# Patient Record
Sex: FEMALE | Race: ASIAN | NOT HISPANIC OR LATINO | Employment: OTHER | URBAN - METROPOLITAN AREA
[De-identification: names, ages, dates, MRNs, and addresses within clinical notes are randomized per-mention and may not be internally consistent; named-entity substitution may affect disease eponyms.]

---

## 2023-08-24 ENCOUNTER — OFFICE VISIT (OUTPATIENT)
Dept: OBSTETRICS AND GYNECOLOGY | Facility: CLINIC | Age: 83
End: 2023-08-24
Payer: MEDICARE

## 2023-08-24 VITALS
SYSTOLIC BLOOD PRESSURE: 138 MMHG | BODY MASS INDEX: 24.74 KG/M2 | WEIGHT: 126 LBS | DIASTOLIC BLOOD PRESSURE: 62 MMHG | HEIGHT: 60 IN

## 2023-08-24 DIAGNOSIS — N81.4 CYSTOCELE WITH PROLAPSE: Primary | ICD-10-CM

## 2023-08-24 RX ORDER — AMLODIPINE BESYLATE 2.5 MG/1
TABLET ORAL
COMMUNITY
Start: 2023-06-14

## 2023-08-24 RX ORDER — UBIDECARENONE 200 MG
1 CAPSULE ORAL
COMMUNITY

## 2023-08-24 RX ORDER — LEVOTHYROXINE SODIUM 0.1 MG/1
100 TABLET ORAL DAILY
COMMUNITY

## 2023-08-24 RX ORDER — DIPHENOXYLATE HYDROCHLORIDE AND ATROPINE SULFATE 2.5; .025 MG/1; MG/1
TABLET ORAL
COMMUNITY

## 2023-08-24 RX ORDER — ATORVASTATIN CALCIUM 40 MG/1
40 TABLET, FILM COATED ORAL DAILY
COMMUNITY

## 2023-08-24 RX ORDER — YEAST,DRIED (S. CEREVISIAE)
1 POWDER (GRAM) ORAL
COMMUNITY

## 2023-08-24 RX ORDER — ASCORBIC ACID 500 MG
1 TABLET ORAL DAILY
COMMUNITY

## 2023-08-24 RX ORDER — VITAMIN E 268 MG
1 CAPSULE ORAL
COMMUNITY

## 2023-08-24 RX ORDER — LOSARTAN POTASSIUM AND HYDROCHLOROTHIAZIDE 25; 100 MG/1; MG/1
1 TABLET ORAL DAILY
COMMUNITY

## 2023-08-24 RX ORDER — TAMSULOSIN HYDROCHLORIDE 0.4 MG/1
0.4 CAPSULE ORAL DAILY
COMMUNITY

## 2023-08-24 RX ORDER — ALENDRONATE SODIUM 70 MG/1
TABLET ORAL
COMMUNITY
Start: 2023-06-20

## 2023-08-24 RX ORDER — AMOXICILLIN 500 MG
2 CAPSULE ORAL DAILY
COMMUNITY

## 2023-08-24 NOTE — PROGRESS NOTES
"      Nori Ulloa is a 83 y.o. patient who presents for follow up of   Chief Complaint   Patient presents with    VAGINAL LEISON       HPI 83-year-old female with complaints of nocturia and fullness/something in the vagina.  Sounds like prolapse.  Worsening.  New problem to me, new patient    The following portions of the patient's history were reviewed and updated as appropriate: allergies, current medications and problem list.    Review of Systems   Constitutional:  Negative for appetite change, fever and unexpected weight change.   HENT:  Negative for congestion and sore throat.    Respiratory:  Negative for cough and shortness of breath.    Cardiovascular:  Negative for chest pain and palpitations.   Gastrointestinal:  Negative for abdominal distention, abdominal pain, constipation, diarrhea, nausea and vomiting.   Endocrine: Negative.    Genitourinary:  Positive for vaginal pain. Negative for dyspareunia, menstrual problem, pelvic pain and vaginal discharge.   Skin: Negative.    Neurological:  Negative for dizziness and syncope.   Hematological: Negative.    Psychiatric/Behavioral:  Negative for dysphoric mood and sleep disturbance. The patient is not nervous/anxious.      /62   Ht 152.4 cm (60\")   Wt 57.2 kg (126 lb)   BMI 24.61 kg/mý     Physical Exam  Vitals and nursing note reviewed. Exam conducted with a chaperone present.   Constitutional:       Appearance: She is well-developed.   HENT:      Head: Normocephalic and atraumatic.   Pulmonary:      Effort: Pulmonary effort is normal. No respiratory distress.   Abdominal:      General: There is no distension.      Palpations: Abdomen is soft. There is no mass.      Tenderness: There is no abdominal tenderness. There is no guarding or rebound.   Genitourinary:     General: Normal vulva.      Exam position: Lithotomy position.      Labia:         Right: No rash or tenderness.         Left: No rash or tenderness.       Urethra: Prolapse present.      " Vagina: Prolapsed vaginal walls present. No vaginal discharge, erythema, tenderness or lesions.      Cervix: Normal.      Uterus: Normal.       Adnexa: Right adnexa normal and left adnexa normal.   Musculoskeletal:         General: Normal range of motion.   Skin:     General: Skin is warm and dry.   Neurological:      Mental Status: She is alert and oriented to person, place, and time.   Psychiatric:         Behavior: Behavior normal.         Thought Content: Thought content normal.         Judgment: Judgment normal.       Assessment/Plan    Diagnoses and all orders for this visit:    1. Cystocele with prolapse (Primary)    Patient has noticed prolapse.  She has cystocele with vault prolapse as well.  She has a prolapsed urethra as well.  Discussed options of pessary and urogynecology consult.  As long as is not dangerous she does not want to do anything at this time.    Return if symptoms worsen or fail to improve.    I spent 30 minutes caring for Nori on this date of service. This time includes time spent by me in the following activities: preparing for the visit, obtaining and/or reviewing a separately obtained history, performing a medically appropriate examination and/or evaluation, counseling and educating the patient/family/caregiver, and documenting information in the medical record     Kal Rodrigues MD  8/24/2023  13:55 EDT

## 2024-07-30 ENCOUNTER — TRANSCRIBE ORDERS (OUTPATIENT)
Dept: ADMINISTRATIVE | Facility: HOSPITAL | Age: 84
End: 2024-07-30
Payer: MEDICARE

## 2024-07-30 DIAGNOSIS — M54.41 CHRONIC RIGHT-SIDED LOW BACK PAIN WITH RIGHT-SIDED SCIATICA: Primary | ICD-10-CM

## 2024-07-30 DIAGNOSIS — M54.2 CERVICALGIA: Primary | ICD-10-CM

## 2024-07-30 DIAGNOSIS — G89.29 CHRONIC RIGHT-SIDED LOW BACK PAIN WITH RIGHT-SIDED SCIATICA: Primary | ICD-10-CM

## 2024-08-10 ENCOUNTER — HOSPITAL ENCOUNTER (OUTPATIENT)
Dept: MRI IMAGING | Facility: HOSPITAL | Age: 84
Discharge: HOME | End: 2024-08-10
Payer: MEDICARE

## 2024-08-10 DIAGNOSIS — G89.29 CHRONIC RIGHT-SIDED LOW BACK PAIN WITH RIGHT-SIDED SCIATICA: ICD-10-CM

## 2024-08-10 DIAGNOSIS — M54.41 CHRONIC RIGHT-SIDED LOW BACK PAIN WITH RIGHT-SIDED SCIATICA: ICD-10-CM

## 2024-08-10 DIAGNOSIS — M54.2 CERVICALGIA: ICD-10-CM

## 2024-08-10 PROCEDURE — 72148 MRI LUMBAR SPINE W/O DYE: CPT

## 2024-08-10 PROCEDURE — 72141 MRI NECK SPINE W/O DYE: CPT

## 2025-02-10 ENCOUNTER — TELEPHONE (OUTPATIENT)
Age: 85
End: 2025-02-10

## 2025-02-10 NOTE — TELEPHONE ENCOUNTER
Caller: GINGER    Relationship to patient: Child    Best call back number: [phone]     Patient is needing: THEY ARE RETURNING [facility]'S CALL BUT GINGER SAID WHEN THEY GET TRANSFERRED THEY NEVER REACH ANYONE AND HE HAS LEFT MESSAGES ON [employer] MACHINE. PLS CALL AND ADVISE ON SCHEDULING.

## 2025-02-18 ENCOUNTER — OFFICE VISIT (OUTPATIENT)
Dept: CARDIOLOGY | Facility: CLINIC | Age: 85
End: 2025-02-18
Payer: MEDICARE

## 2025-02-18 VITALS
DIASTOLIC BLOOD PRESSURE: 82 MMHG | SYSTOLIC BLOOD PRESSURE: 134 MMHG | WEIGHT: 125 LBS | HEIGHT: 60 IN | HEART RATE: 69 BPM | BODY MASS INDEX: 24.54 KG/M2

## 2025-02-18 DIAGNOSIS — R06.09 DYSPNEA ON EXERTION: ICD-10-CM

## 2025-02-18 DIAGNOSIS — E78.2 MIXED HYPERLIPIDEMIA: Primary | ICD-10-CM

## 2025-02-18 DIAGNOSIS — I10 PRIMARY HYPERTENSION: ICD-10-CM

## 2025-02-18 RX ORDER — NAPROXEN 500 MG/1
TABLET ORAL
COMMUNITY
Start: 2024-11-25

## 2025-02-18 NOTE — PROGRESS NOTES
CARDIOLOGY    Waldemar Rosas MD    ENCOUNTER DATE:  02/18/25      Nori Ulloa / 84 y.o. / female        CHIEF COMPLAINT / REASON FOR OFFICE VISIT     NEW PATIENT  (Mild Aortic insuffciency Thoracic Aortic )      HISTORY OF PRESENT ILLNESS       HPI    Nori Ulloa is a 84 y.o. female with aortic regurgitation/ascending aorta dilatation, hypertension, hyperlipidemia, ? lacunar infarct, tension headache, hypothyroidism who presents to establish care as a new patient.      Not accompanied at the visit. Most of the conversation was carried out in a combination of English and mandarin Chinese though her native tongue is Cantonese.  Additional information was obtained by telephone with her son and DEXTER Ulloa.  Today, patient has no acute complaints. Tries to stay active with walking, limited by back pain and left foot pain.  She is not aware of specific reasons occasional chest pain and mild feet swelling, which is not new and does not bother her. Chronic headache and neck pain as well as occasional runny nose. Denies dyspnea on exertion, palpitation, orthopnea, PND, dizziness, syncope, bleeding, or unexpected falls. Does not recall having had a heart attack. MRI brain at [facility] last year 11/2024 reports findings consistent with small chronic lacunar infarct. Never smoker, denies alcohol or substance abuse.  Of note, her daughter had a stroke several years ago and she has since intentionally lost about 20 pounds of weight.    REVIEW OF SYSTEMS     Review of Systems   Constitutional: Positive for weight loss.   Cardiovascular:  Positive for chest pain and dyspnea on exertion. Negative for irregular heartbeat, leg swelling, orthopnea, palpitations, paroxysmal nocturnal dyspnea and syncope.   Respiratory:  Positive for shortness of breath.    Hematologic/Lymphatic: Negative for bleeding problem.   Musculoskeletal:  Positive for arthritis and joint pain. Negative for falls.   Gastrointestinal:  Negative for hematochezia and  "melena.   Genitourinary:  Negative for hematuria.   Neurological:  Negative for dizziness and light-headedness.   Psychiatric/Behavioral:  Negative for altered mental status.            MEDICATIONS      Outpatient Encounter Medications as of 2/18/2025   Medication Sig Dispense Refill    amLODIPine (NORVASC) 2.5 MG tablet       ascorbic acid (VITAMIN C) 500 MG tablet Take 1 tablet by mouth Daily.      atorvastatin (LIPITOR) 40 MG tablet Take 1 tablet by mouth Daily.      Bioflavonoid Products (Vitamin C Plus) 1000 MG tablet Take  by mouth.      Cholecalciferol 25 MCG (1000 UT) capsule Take 1 capsule by mouth.      Coenzyme Q10 200 MG capsule Take 1 capsule by mouth.      Coral Calcium 1000 (390 Ca) MG tablet Take  by mouth.      Glucos-Chond-Hyal Ac-Ca Fructo (Move Free Joint Health Advance) tablet Take 1 tablet by mouth.      levothyroxine (SYNTHROID, LEVOTHROID) 100 MCG tablet Take 1 tablet by mouth Daily.      losartan-hydrochlorothiazide (HYZAAR) 100-25 MG per tablet Take 1 tablet by mouth Daily.      multivitamin (Multiple Vitamins) tablet tablet Take  by mouth.      naproxen (NAPROSYN) 500 MG tablet       Omega-3 Fatty Acids (fish oil) 1200 MG capsule capsule Take 2 capsules by mouth Daily.      tamsulosin (FLOMAX) 0.4 MG capsule 24 hr capsule Take 1 capsule by mouth Daily.      vitamin E 400 UNIT capsule Take 1 capsule by mouth.      alendronate (FOSAMAX) 70 MG tablet  (Patient not taking: Reported on 2/18/2025)       No facility-administered encounter medications on file as of 2/18/2025.         VITAL SIGNS     Visit Vitals  /82 Comment: recheck B/P   Pulse 69   Ht 152.4 cm (60\")   Wt 56.7 kg (125 lb)   BMI 24.41 kg/m²         Wt Readings from Last 3 Encounters:   02/18/25 56.7 kg (125 lb)   08/24/23 57.2 kg (126 lb)     Body mass index is 24.41 kg/m².      PHYSICAL EXAMINATION     Vitals and nursing note reviewed.   Constitutional:       Appearance: Not in distress.   Neck:      Vascular: No JVR. " "  Pulmonary:      Effort: Pulmonary effort is normal.      Breath sounds: Normal breath sounds. No wheezing. No rhonchi. No rales.   Cardiovascular:      Normal rate. Regular rhythm.      Murmurs: There is no murmur.   Edema:     Peripheral edema absent.   Abdominal:      General: There is no distension.      Palpations: Abdomen is soft.      Tenderness: There is no abdominal tenderness.   Musculoskeletal:      Cervical back: Neck supple. Skin:     General: Skin is cool.   Neurological:      Mental Status: Alert and oriented to person, place and time.         REVIEWED DATA       ECG 12 Lead    Date/Time: 2/18/2025 11:19 AM  Performed by: Waldemar Rosas MD    Authorized by: Waldemar Rosas MD  Previous ECG: no previous ECG available  Rhythm: sinus rhythm    Clinical impression: normal ECG        No results found for: \"WBC\", \"HGB\", \"HCT\", \"MCV\", \"PLT\"    No results found for: \"HGBA1C\"    No results found for: \"GLUCOSE\", \"BUN\", \"CREATININE\", \"EGFRRESULT\", \"EGFR\", \"BCR\", \"K\", \"CO2\", \"CALCIUM\", \"PROTENTOTREF\", \"ALBUMIN\", \"BILITOT\", \"AST\", \"ALT\"    No results found for: \"CHOL\", \"CHLPL\", \"TRIG\", \"HDL\", \"LDL\", \"LDLDIRECT\"          ASSESSMENT & PLAN     Diagnoses and all orders for this visit:    1. Mixed hyperlipidemia (Primary)  -     Adult Transthoracic Echo Complete w/ Color, Spectral and Contrast if Necessary Per Protocol; Future    2. Primary hypertension  -     Adult Transthoracic Echo Complete w/ Color, Spectral and Contrast if Necessary Per Protocol; Future    3. Dyspnea on exertion  -     Adult Transthoracic Echo Complete w/ Color, Spectral and Contrast if Necessary Per Protocol; Future       Aortic regurgitation/ascending aorta dilatation: This was listed as a diagnosis although patient is not aware of having had an echocardiogram.  We will perform a echo as outpatient, also we will reach out to PCP office to obtain recent blood work.  Hypertension: In office /78, mildly elevated, HR 69.  Repeat /82, " better.  On valsartan-HCTZ 100-25 daily, amlodipine 2.5 daily, defer therapy up titration for now given some degree of frailty although we may uptitrate amlodipine versus starting a low-dose beta-blocker depending on echo findings and ambulatory readings.  Hyperlipidemia/ASCVD risk: Occasional atypical chest pain which is not new.  ECG in office today showed NSR without acute ischemic changes.  Outpatient echo as above; on atorvastatin 40 daily, will continue without change and obtain lab work from PCP as above.  Hx of stroke?:  MRI brain at [facility] last year 11/2024 reports findings consistent with small chronic lacunar infarct; neither patient nor son is aware of this information.  Encouraged reaching out to neurology.  Depending on echo findings specifically about atrial size, we may perform further workup such as Holter/Zio patch to rule out underlying atrial fibrillation.  Tension headache: Follows neurology through [facility], defer management plan to them.    I spent 39 minutes caring for Nori on this date of service. This time includes time spent by me in the following activities: preparing for the visit, reviewing tests, performing a medically appropriate examination and/or evaluation, counseling and educating the patient/family/caregiver, referring and communicating with other health care professionals, and care coordination.     Additionally, I am providing longitudinal care which includes continuously being a focal point for all of the patient's health care services and ongoing medical care related to hypertension and ascending aorta disease as documented above.    Waldemar Rosas MD. PhD. [facility]  02/18/25  11:12 EST    Part of this note may be an electronic transcription/translation of spoken language to printed text using the Dragon dictation system.

## 2025-02-26 ENCOUNTER — TELEPHONE (OUTPATIENT)
Dept: CARDIOLOGY | Facility: CLINIC | Age: 85
End: 2025-02-26
Payer: MEDICARE

## 2025-02-26 ENCOUNTER — HOSPITAL ENCOUNTER (OUTPATIENT)
Dept: CARDIOLOGY | Facility: HOSPITAL | Age: 85
Discharge: HOME | End: 2025-02-26
Admitting: INTERNAL MEDICINE
Payer: MEDICARE

## 2025-02-26 VITALS
DIASTOLIC BLOOD PRESSURE: 76 MMHG | BODY MASS INDEX: 24.54 KG/M2 | SYSTOLIC BLOOD PRESSURE: 122 MMHG | HEIGHT: 60 IN | WEIGHT: 125 LBS

## 2025-02-26 DIAGNOSIS — I10 PRIMARY HYPERTENSION: ICD-10-CM

## 2025-02-26 DIAGNOSIS — R06.09 DYSPNEA ON EXERTION: ICD-10-CM

## 2025-02-26 DIAGNOSIS — E78.2 MIXED HYPERLIPIDEMIA: ICD-10-CM

## 2025-02-26 LAB
AORTIC ARCH: 3 CM
ASCENDING AORTA: 3.1 CM
AV MEAN PRESS GRAD SYS DOP V1V2: 3 MMHG
AV VMAX SYS DOP: 131.7 CM/SEC
BH CV ECHO MEAS - ACS: 1.62 CM
BH CV ECHO MEAS - AI P1/2T: 2138 MSEC
BH CV ECHO MEAS - AO MAX PG: 6.9 MMHG
BH CV ECHO MEAS - AO ROOT DIAM: 3.4 CM
BH CV ECHO MEAS - AO V2 VTI: 28.6 CM
BH CV ECHO MEAS - AVA(I,D): 2.38 CM2
BH CV ECHO MEAS - EDV(CUBED): 46.1 ML
BH CV ECHO MEAS - EDV(MOD-SP2): 94 ML
BH CV ECHO MEAS - EDV(MOD-SP4): 95 ML
BH CV ECHO MEAS - EF(MOD-SP2): 67 %
BH CV ECHO MEAS - EF(MOD-SP4): 66.3 %
BH CV ECHO MEAS - ESV(CUBED): 13.3 ML
BH CV ECHO MEAS - ESV(MOD-SP2): 31 ML
BH CV ECHO MEAS - ESV(MOD-SP4): 32 ML
BH CV ECHO MEAS - FS: 33.9 %
BH CV ECHO MEAS - IVS/LVPW: 1.17 CM
BH CV ECHO MEAS - IVSD: 1.03 CM
BH CV ECHO MEAS - LAT PEAK E' VEL: 6.9 CM/SEC
BH CV ECHO MEAS - LV DIASTOLIC VOL/BSA (35-75): 62.1 CM2
BH CV ECHO MEAS - LV MASS(C)D: 100.9 GRAMS
BH CV ECHO MEAS - LV MAX PG: 6.1 MMHG
BH CV ECHO MEAS - LV MEAN PG: 2.24 MMHG
BH CV ECHO MEAS - LV SYSTOLIC VOL/BSA (12-30): 20.9 CM2
BH CV ECHO MEAS - LV V1 MAX: 123.1 CM/SEC
BH CV ECHO MEAS - LV V1 VTI: 26.3 CM
BH CV ECHO MEAS - LVIDD: 3.6 CM
BH CV ECHO MEAS - LVIDS: 2.37 CM
BH CV ECHO MEAS - LVOT AREA: 2.6 CM2
BH CV ECHO MEAS - LVOT DIAM: 1.81 CM
BH CV ECHO MEAS - LVPWD: 0.88 CM
BH CV ECHO MEAS - MED PEAK E' VEL: 6.1 CM/SEC
BH CV ECHO MEAS - MV A DUR: 0.15 SEC
BH CV ECHO MEAS - MV A MAX VEL: 110.5 CM/SEC
BH CV ECHO MEAS - MV DEC SLOPE: 314.6 CM/SEC2
BH CV ECHO MEAS - MV DEC TIME: 0.21 SEC
BH CV ECHO MEAS - MV E MAX VEL: 86.5 CM/SEC
BH CV ECHO MEAS - MV E/A: 0.78
BH CV ECHO MEAS - MV MAX PG: 3.5 MMHG
BH CV ECHO MEAS - MV MEAN PG: 1.47 MMHG
BH CV ECHO MEAS - MV P1/2T: 66.7 MSEC
BH CV ECHO MEAS - MV V2 VTI: 28 CM
BH CV ECHO MEAS - MVA(P1/2T): 3.3 CM2
BH CV ECHO MEAS - MVA(VTI): 2.42 CM2
BH CV ECHO MEAS - PA ACC TIME: 0.11 SEC
BH CV ECHO MEAS - PA V2 MAX: 69.2 CM/SEC
BH CV ECHO MEAS - RAP SYSTOLE: 3 MMHG
BH CV ECHO MEAS - RV MAX PG: 0.82 MMHG
BH CV ECHO MEAS - RV V1 MAX: 45.3 CM/SEC
BH CV ECHO MEAS - RV V1 VTI: 8.8 CM
BH CV ECHO MEAS - RVSP: 20.5 MMHG
BH CV ECHO MEAS - SV(LVOT): 67.9 ML
BH CV ECHO MEAS - SV(MOD-SP2): 63 ML
BH CV ECHO MEAS - SV(MOD-SP4): 63 ML
BH CV ECHO MEAS - SVI(LVOT): 44.4 ML/M2
BH CV ECHO MEAS - SVI(MOD-SP2): 41.2 ML/M2
BH CV ECHO MEAS - SVI(MOD-SP4): 41.2 ML/M2
BH CV ECHO MEAS - TAPSE (>1.6): 2.6 CM
BH CV ECHO MEAS - TR MAX PG: 17.5 MMHG
BH CV ECHO MEAS - TR MAX VEL: 209 CM/SEC
BH CV ECHO MEASUREMENTS AVERAGE E/E' RATIO: 13.31
BH CV XLRA - RV BASE: 2.8 CM
BH CV XLRA - RV LENGTH: 7.8 CM
BH CV XLRA - RV MID: 2.35 CM
BH CV XLRA - TDI S': 15.3 CM/SEC
LV EF BIPLANE MOD: 66.9 %
SINUS: 3.4 CM
STJ: 2.7 CM

## 2025-02-26 PROCEDURE — 93306 TTE W/DOPPLER COMPLETE: CPT

## 2025-02-26 NOTE — TELEPHONE ENCOUNTER
Notified Nori Ulloa's son, Jose, of results as allowed by verbal release.  He verbalizes understanding.     Niki Li RN  [facility] Triage  02/26/25 16:16 EST

## 2025-02-26 NOTE — TELEPHONE ENCOUNTER
----- Message from Waldemar Rosas sent at 2/26/2025  3:59 PM EST -----  There is mild aortic regurgitation, otherwise overall reassuring echo and no obvious evidence of ascending aorta dilatation.  No new changes at this time if patient otherwise feels well, CCing PCP Dr. Gifford as update.

## 2025-02-26 NOTE — TELEPHONE ENCOUNTER
Called and left VM, will continue to try to reach pt.    HUB- please put patient straight through to triage    Cherri Ren, RN  Triage RN  02/26/25 16:09 EST